# Patient Record
Sex: FEMALE | Race: WHITE | NOT HISPANIC OR LATINO | URBAN - METROPOLITAN AREA
[De-identification: names, ages, dates, MRNs, and addresses within clinical notes are randomized per-mention and may not be internally consistent; named-entity substitution may affect disease eponyms.]

---

## 2022-08-15 ENCOUNTER — OFFICE (OUTPATIENT)
Dept: URBAN - METROPOLITAN AREA CLINIC 79 | Facility: CLINIC | Age: 67
End: 2022-08-15

## 2022-08-15 VITALS
HEIGHT: 65 IN | WEIGHT: 271 LBS | DIASTOLIC BLOOD PRESSURE: 75 MMHG | HEART RATE: 65 BPM | SYSTOLIC BLOOD PRESSURE: 136 MMHG | TEMPERATURE: 97 F

## 2022-08-15 DIAGNOSIS — K62.5 HEMORRHAGE OF ANUS AND RECTUM: ICD-10-CM

## 2022-08-15 DIAGNOSIS — E66.01 MORBID (SEVERE) OBESITY DUE TO EXCESS CALORIES: ICD-10-CM

## 2022-08-15 DIAGNOSIS — Z98.84 BARIATRIC SURGERY STATUS: ICD-10-CM

## 2022-08-15 DIAGNOSIS — E11.9 TYPE 2 DIABETES MELLITUS WITHOUT COMPLICATIONS: ICD-10-CM

## 2022-08-15 DIAGNOSIS — G47.33 OBSTRUCTIVE SLEEP APNEA (ADULT) (PEDIATRIC): ICD-10-CM

## 2022-08-15 DIAGNOSIS — R10.13 EPIGASTRIC PAIN: ICD-10-CM

## 2022-08-15 DIAGNOSIS — I50.9 HEART FAILURE, UNSPECIFIED: ICD-10-CM

## 2022-08-15 DIAGNOSIS — R11.10 VOMITING, UNSPECIFIED: ICD-10-CM

## 2022-08-15 DIAGNOSIS — R12 HEARTBURN: ICD-10-CM

## 2022-08-15 DIAGNOSIS — K22.70 BARRETT'S ESOPHAGUS WITHOUT DYSPLASIA: ICD-10-CM

## 2022-08-15 PROCEDURE — 99205 OFFICE O/P NEW HI 60 MIN: CPT | Performed by: PHYSICIAN ASSISTANT

## 2022-08-15 NOTE — SERVICEHPINOTES
AVERY HAYS   is a   67   year old   white female with morbid obesity CHF, DMT2, HTN, HLD, thyroid disease, ANDREW, GERD who is being seen in consultation at the request of   MARYANN AJ   for uncontrolled reflux complaint. She reports unable to lay down due to gastric regurgitation Nocturnal heartburn interferes with sleep. She sleeps on recliner given severe heartburn. She is on rx Pantoprazole 40 mg qd used as instructed for the past 5 years that is no longer effective. Prior use of Omeprazole that did not help. 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  She has DMT2, on insulin Last hgA1c at " 8 percent" per patient report. At home fasting 's. She is followed by every 6 months by cardiologist Dr Jessica Varela due to CHF on Lasix.  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 Denies chest pain, n/v, dysphagia, lower abdominal pain, change in BMs, melena, weight loss. 
dallas haynes Reviewed labs by PCP from 04/2022 hgb 13/hct 43, WBC 7.7 nl, PLTs 260, ALT 12/ AST 23, hgA1c 9.5%,

## 2022-10-13 ENCOUNTER — ON CAMPUS - OUTPATIENT (OUTPATIENT)
Dept: URBAN - METROPOLITAN AREA HOSPITAL 16 | Facility: HOSPITAL | Age: 67
End: 2022-10-13
Payer: COMMERCIAL

## 2022-10-13 DIAGNOSIS — R13.10 DYSPHAGIA, UNSPECIFIED: ICD-10-CM

## 2022-10-13 DIAGNOSIS — K31.A0 GASTRIC INTESTINAL METAPLASIA, UNSPECIFIED: ICD-10-CM

## 2022-10-13 DIAGNOSIS — R12 HEARTBURN: ICD-10-CM

## 2022-10-13 PROCEDURE — 43239 EGD BIOPSY SINGLE/MULTIPLE: CPT | Performed by: INTERNAL MEDICINE

## 2022-11-04 PROBLEM — Z12.11 SCREENING COLON: Status: ACTIVE | Noted: 2022-11-04
